# Patient Record
Sex: MALE | Race: OTHER | NOT HISPANIC OR LATINO | URBAN - METROPOLITAN AREA
[De-identification: names, ages, dates, MRNs, and addresses within clinical notes are randomized per-mention and may not be internally consistent; named-entity substitution may affect disease eponyms.]

---
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
## 2025-07-24 NOTE — ED ADULT TRIAGE NOTE - PRO INTERPRETER NEED 2
Refill request for escitalopram 20 mg   Last refill -3/31/2025  Last apt-3/31/2025  Next apt - not scheduled   English